# Patient Record
Sex: MALE | Race: WHITE
[De-identification: names, ages, dates, MRNs, and addresses within clinical notes are randomized per-mention and may not be internally consistent; named-entity substitution may affect disease eponyms.]

---

## 2019-07-15 ENCOUNTER — HOSPITAL ENCOUNTER (INPATIENT)
Dept: HOSPITAL 95 - ER | Age: 59
LOS: 2 days | Discharge: HOME | DRG: 280 | End: 2019-07-17
Attending: HOSPITALIST | Admitting: HOSPITALIST
Payer: COMMERCIAL

## 2019-07-15 VITALS — WEIGHT: 207.68 LBS | HEIGHT: 74.02 IN | BODY MASS INDEX: 26.65 KG/M2

## 2019-07-15 DIAGNOSIS — G47.30: ICD-10-CM

## 2019-07-15 DIAGNOSIS — F32.9: ICD-10-CM

## 2019-07-15 DIAGNOSIS — E66.3: ICD-10-CM

## 2019-07-15 DIAGNOSIS — I11.0: ICD-10-CM

## 2019-07-15 DIAGNOSIS — N52.9: ICD-10-CM

## 2019-07-15 DIAGNOSIS — I21.4: Primary | ICD-10-CM

## 2019-07-15 DIAGNOSIS — E87.6: ICD-10-CM

## 2019-07-15 DIAGNOSIS — I27.20: ICD-10-CM

## 2019-07-15 DIAGNOSIS — I25.10: ICD-10-CM

## 2019-07-15 DIAGNOSIS — I50.21: ICD-10-CM

## 2019-07-15 LAB
ALBUMIN SERPL BCP-MCNC: 4 G/DL
ALBUMIN/GLOB SERPL: 1.1 {RATIO}
ALT SERPL W P-5'-P-CCNC: 21 U/L
ANION GAP SERPL CALCULATED.4IONS-SCNC: 5 MMOL/L
AST SERPL W P-5'-P-CCNC: 17 U/L
BASOPHILS # BLD AUTO: 0.05 K/MM3
BASOPHILS NFR BLD AUTO: 1 %
BILIRUB SERPL-MCNC: 0.8 MG/DL
BUN SERPL-MCNC: 19 MG/DL
CALCIUM SERPL-MCNC: 8.5 MG/DL
CHLORIDE SERPL-SCNC: 106 MMOL/L
CO2 SERPL-SCNC: 26 MMOL/L
CREAT SERPL-MCNC: 0.92 MG/DL
DEPRECATED RDW RBC AUTO: 42.5 FL
EOSINOPHIL # BLD AUTO: 0.23 K/MM3
EOSINOPHIL NFR BLD AUTO: 3 %
ERYTHROCYTE [DISTWIDTH] IN BLOOD BY AUTOMATED COUNT: 13.2 %
GLOBULIN SER CALC-MCNC: 3.6 G/DL
GLUCOSE SERPL-MCNC: 90 MG/DL
HCT VFR BLD AUTO: 43.7 %
HGB BLD-MCNC: 14.6 G/DL
IMM GRANULOCYTES # BLD AUTO: 0.01 K/MM3
IMM GRANULOCYTES NFR BLD AUTO: 0 %
LYMPHOCYTES # BLD AUTO: 1.58 K/MM3
LYMPHOCYTES NFR BLD AUTO: 23 %
MCHC RBC AUTO-ENTMCNC: 33.4 G/DL
MCV RBC AUTO: 88 FL
MONOCYTES # BLD AUTO: 1 K/MM3
MONOCYTES NFR BLD AUTO: 15 %
NEUTROPHILS # BLD AUTO: 3.92 K/MM3
NEUTROPHILS NFR BLD AUTO: 58 %
NRBC # BLD AUTO: 0 K/MM3
NRBC BLD AUTO-RTO: 0 /100 WBC
PLATELET # BLD AUTO: 165 K/MM3
POTASSIUM SERPL-SCNC: 3.6 MMOL/L
PROT SERPL-MCNC: 7.6 G/DL
PROTHROMBIN TIME: 10.9 SEC
SODIUM SERPL-SCNC: 137 MMOL/L
TROPONIN I SERPL-MCNC: 0.24 NG/ML

## 2019-07-15 PROCEDURE — C1769 GUIDE WIRE: HCPCS

## 2019-07-15 PROCEDURE — C1894 INTRO/SHEATH, NON-LASER: HCPCS

## 2019-07-15 PROCEDURE — A9270 NON-COVERED ITEM OR SERVICE: HCPCS

## 2019-07-15 PROCEDURE — G0480 DRUG TEST DEF 1-7 CLASSES: HCPCS

## 2019-07-16 LAB
ANION GAP SERPL CALCULATED.4IONS-SCNC: 6 MMOL/L
BASOPHILS # BLD AUTO: 0.07 K/MM3
BASOPHILS NFR BLD AUTO: 1 %
BUN SERPL-MCNC: 12 MG/DL
CALCIUM SERPL-MCNC: 7.9 MG/DL
CHLORIDE SERPL-SCNC: 108 MMOL/L
CHOLEST SERPL-MCNC: 140 MG/DL
CHOLEST/HDLC SERPL: 3.6 {RATIO}
CO2 SERPL-SCNC: 26 MMOL/L
CREAT SERPL-MCNC: 0.71 MG/DL
DEPRECATED RDW RBC AUTO: 42.5 FL
EOSINOPHIL # BLD AUTO: 0.25 K/MM3
EOSINOPHIL NFR BLD AUTO: 4 %
ERYTHROCYTE [DISTWIDTH] IN BLOOD BY AUTOMATED COUNT: 13.2 %
GLUCOSE SERPL-MCNC: 95 MG/DL
HCT VFR BLD AUTO: 41.4 %
HDLC SERPL-MCNC: 39 MG/DL
HGB BLD-MCNC: 14 G/DL
IMM GRANULOCYTES # BLD AUTO: 0.03 K/MM3
IMM GRANULOCYTES NFR BLD AUTO: 1 %
LDLC SERPL CALC-MCNC: 90 MG/DL
LDLC/HDLC SERPL: 2.3 {RATIO}
LYMPHOCYTES # BLD AUTO: 1.73 K/MM3
LYMPHOCYTES NFR BLD AUTO: 27 %
MCHC RBC AUTO-ENTMCNC: 33.8 G/DL
MCV RBC AUTO: 87 FL
MONOCYTES # BLD AUTO: 0.97 K/MM3
MONOCYTES NFR BLD AUTO: 15 %
NEUTROPHILS # BLD AUTO: 3.46 K/MM3
NEUTROPHILS NFR BLD AUTO: 53 %
NRBC # BLD AUTO: 0 K/MM3
NRBC BLD AUTO-RTO: 0 /100 WBC
PLATELET # BLD AUTO: 155 K/MM3
POTASSIUM SERPL-SCNC: 3.4 MMOL/L
SODIUM SERPL-SCNC: 140 MMOL/L
TRIGL SERPL-MCNC: 56 MG/DL
VLDLC SERPL CALC-MCNC: 11 MG/DL

## 2019-07-16 PROCEDURE — 4A023N7 MEASUREMENT OF CARDIAC SAMPLING AND PRESSURE, LEFT HEART, PERCUTANEOUS APPROACH: ICD-10-PCS | Performed by: INTERNAL MEDICINE

## 2019-07-16 PROCEDURE — B211YZZ FLUOROSCOPY OF MULTIPLE CORONARY ARTERIES USING OTHER CONTRAST: ICD-10-PCS | Performed by: INTERNAL MEDICINE

## 2019-07-16 NOTE — NUR
Tried to take 2cc of air from band and hestarted to bleed right away.
reapplied pressure and bleeding stopped and no hematoma. Patient up in chair
at bedside watching TV. Started NS infusion. heparin gtt stopped and SQ
started.

## 2019-07-16 NOTE — NUR
START OF SHIFT:
REPORT FROM ADEN PHAN. PT SITTING IN CHAIR WATCHING TV. A+O X4, PLEASANT, VSS.
R RADIAL WITH TR BAND IN PLACE ASSESSED BY BOTH RN'S AND REPORTED TO HAVE
APPRX 11cc REMAINING D/T CONTINUAL LEAKING FROM INSERTION SITE. LAST AIR
INSTILL APPRX 30 MINUTES PRIOR TO SHIFT CHANGE ACCORDING TO ADEN PHAN. NO
FURTHER LEAKING NOTED DURING ASSESSMENT PER ADEN PHAN. WILL CONTINUE TO MONITOR
AND ASSESS AS AIR RELEASED NATURALLY FROM TR BAND.

## 2019-07-16 NOTE — NUR
Dr Robins in room with patient talking about possible cath. Echo done and she
was updated on results. He is in room with wife .

## 2019-07-16 NOTE — NUR
Patient awakened to take meds. CBG are all under coverage. He transfered back
to bed after he was falling asleep on bedside table. He has some c/o foot
tingling and he was given scheduled Gabepentin.

## 2019-07-16 NOTE — NUR
Admission/[place] of Care:
 
Patient arrived to unit at 0210hr via stretcher, accompanied by ED staff.
Stood and transferred to ICU bed without difficulty. Alert and oriented x4.
At rest, denies pain, discomfort, SOB, or dyspnea. C/o chest pain 6-7/10 only
when taking deep breath, this has been present for 2 days. VSS, o2-95% on RA.
Heart rhythm shows sinus librado-NSR 59-61, with occasional PVC's. Bilateral
peripheral IV's patent and intact. NS infusing at 150ml/hr, x1L only. Heparin
gtt infusing at 13u/kg/hr, dosed at 100kg, confirmed with EMAR order, next PTT
at 0600hr. Call light in reach, makes needs known. Instructed to not get out
of bed without assistance. Will continue to monitor for pain, comfort, safety.

## 2019-07-16 NOTE — NUR
Tried for third time to release air from TR band and started to bleed with
just 2 cc of air removed. He remains up in chair and wife at bedside. VSS and
denies any pain.

## 2019-07-16 NOTE — NUR
Shift Summary:
 
Patient slept well throughout remainder of shift. Continues to c/o chest pain
upon deep inhalation, otherwise denies pain, discomfort, SOB, or dyspnea. VSS,
o2-94-98% on RA. Peripheral IV's remain patent and intact. Heparin gtt
continues to infuse at 13u/kg/hr, no changes made following 0600hr PTT, next
PTT scheduled for 1200hr. Voided using urinal at bedside without difficulty.
Call light in reach, makes needs known. Will continue to monitor until report
to day shift RN.

## 2019-07-16 NOTE — NUR
Recieved report from Vasiliy PHAN. patient was awake when entering rom and was able
to communicate his needs. He is on Ra and sats in the upper 90%'s. Wife is at
bedside. He denies any current needs or chest pain or any other kind of pain.
He is assist with getting up r/t line and tubes. He is SB 50-60 with
occassional pvc's and systolic 140's with MAP >65. He is currently resting
awaiting cardiology consult.

## 2019-07-17 LAB
ALBUMIN SERPL BCP-MCNC: 3.4 G/DL
ANION GAP SERPL CALCULATED.4IONS-SCNC: 3 MMOL/L
BASOPHILS # BLD AUTO: 0.06 K/MM3
BASOPHILS NFR BLD AUTO: 1 %
BUN SERPL-MCNC: 11 MG/DL
CALCIUM SERPL-MCNC: 8.2 MG/DL
CHLORIDE SERPL-SCNC: 107 MMOL/L
CO2 SERPL-SCNC: 30 MMOL/L
CREAT SERPL-MCNC: 0.91 MG/DL
DEPRECATED RDW RBC AUTO: 42.4 FL
EOSINOPHIL # BLD AUTO: 0.25 K/MM3
EOSINOPHIL NFR BLD AUTO: 4 %
ERYTHROCYTE [DISTWIDTH] IN BLOOD BY AUTOMATED COUNT: 13.2 %
GLUCOSE SERPL-MCNC: 94 MG/DL
HCT VFR BLD AUTO: 43.1 %
HGB BLD-MCNC: 14.1 G/DL
IMM GRANULOCYTES # BLD AUTO: 0.02 K/MM3
IMM GRANULOCYTES NFR BLD AUTO: 0 %
LYMPHOCYTES # BLD AUTO: 1.86 K/MM3
LYMPHOCYTES NFR BLD AUTO: 28 %
MCHC RBC AUTO-ENTMCNC: 32.7 G/DL
MCV RBC AUTO: 88 FL
MONOCYTES # BLD AUTO: 0.89 K/MM3
MONOCYTES NFR BLD AUTO: 14 %
NEUTROPHILS # BLD AUTO: 3.52 K/MM3
NEUTROPHILS NFR BLD AUTO: 53 %
NRBC # BLD AUTO: 0 K/MM3
NRBC BLD AUTO-RTO: 0 /100 WBC
PHOSPHATE SERPL-MCNC: 3 MG/DL
PLATELET # BLD AUTO: 157 K/MM3
POTASSIUM SERPL-SCNC: 3.7 MMOL/L
SODIUM SERPL-SCNC: 140 MMOL/L

## 2019-07-17 NOTE — NUR
Recieved report from Gina PHAN, right TR band site C/D/I and all air out for 15
minutes and will wait an hour before applting dressing. No sign of bleeding or
hematoma. He is alert and aoriented and is able to communicate his needs. Dr Khalil in room going over post discharge treatment plan. He is sitting up in
bed listening. wife not present. He is on RA and sats mid to upper 90%'s. He
has 18ga LAC dressing intact and site WNL's and flushed and SL'd. He also has
20ga IV RAC dressing intact and site WNL's and flushed and SL. He is up to BRP
and is independent in room.

## 2019-07-17 NOTE — NUR
PT AWAKENED EASILY TO RN AND  AT BEDSIDE. PT A+O, VSS. TR BAND WITH
2cc REMOVED-SITE REMAINS UNCHANGED FROM INITIAL ASSESSMENT. PT UP TO RESTROOM
INDEPENDENTLY AND WITHOUT DIFFICULTY IN ROOM. CALL LIGHT WITHING REACH. PT
GIVEN TWO FRESH PILLOWS PER PT REQUEST.

## 2019-07-17 NOTE — NUR
TR BAND:
REMAINS IN PLACE. ASSESSED EACH TIME RN ON ROOM AND REMAINS UNCHANGED FROM
INTIAL ASSESMENT. IN ADDITION TO THE NATURAL RELEASE OF AIR, 2cc REMOVED AT
0000. TR BAND AND ARM BOARD REMAIN IN PLACE WHILE PT SLEEPING.

## 2019-07-17 NOTE — NUR
TR BAND:
LAST REMAINING 5cc AIR OUT AT 0640. SITE REMAINS WITHOUT DRAINAGE THUS FAR.
REPORT TO ADEN PHAN. TRINA DRESSING IN ROOM. ADEN PHAN STATED WILL REMOVE TR
BAND AND PLACE DRESSING.

## 2019-07-17 NOTE — NUR
SHIFT SUMMARY:
PT RESTED T/O NOC WITHOUT C/O PAIN, SOB, OR ANY OTHER DISCOMFORTS. R RADIAL
SITE WITHOUT CHANGE OR DRAINAGE T/O NOC. TR BAND REMAINING IN PLACE WHILE PT
ASLEEP. WILL INFORM ONCOMING RN.

## 2019-07-17 NOTE — NUR
Dr Khalil saw patient and had dietary see patient. After they left Dr Mejia
gave and discharged patient. I had pharmacy come and review all new meds with
patient for side affects. They were given written discharge instructions  and
reviewed as well as education on radial access, sleep apnea, and NSTEMI.
Called in meds to  local [facility] after verifying there insurance was good
there. They returned understanding of all information. I gave physical
education if re bleed of radial site. Pulled IV intact and wrapped in coban.
Patient ambulated to exit and went back on vacation POV.

## 2023-06-07 NOTE — NUR
Patient resting and awaiting removal of TR Band. Dr going to change some BP
meds to keep below 125 systolic. Right TR site C/D/I and no hematoma or
bleeding. Wife at bedside. Euthymic